# Patient Record
Sex: MALE | Race: WHITE
[De-identification: names, ages, dates, MRNs, and addresses within clinical notes are randomized per-mention and may not be internally consistent; named-entity substitution may affect disease eponyms.]

---

## 2020-01-30 PROBLEM — Z00.00 ENCOUNTER FOR PREVENTIVE HEALTH EXAMINATION: Status: ACTIVE | Noted: 2020-01-30

## 2020-02-03 ENCOUNTER — FORM ENCOUNTER (OUTPATIENT)
Age: 60
End: 2020-02-03

## 2020-02-04 ENCOUNTER — OUTPATIENT (OUTPATIENT)
Dept: OUTPATIENT SERVICES | Facility: HOSPITAL | Age: 60
LOS: 1 days | End: 2020-02-04
Payer: COMMERCIAL

## 2020-02-04 ENCOUNTER — APPOINTMENT (OUTPATIENT)
Dept: ORTHOPEDIC SURGERY | Facility: CLINIC | Age: 60
End: 2020-02-04
Payer: COMMERCIAL

## 2020-02-04 ENCOUNTER — APPOINTMENT (OUTPATIENT)
Dept: RADIOLOGY | Facility: CLINIC | Age: 60
End: 2020-02-04

## 2020-02-04 VITALS — WEIGHT: 196 LBS | BODY MASS INDEX: 25.98 KG/M2 | HEIGHT: 73 IN

## 2020-02-04 DIAGNOSIS — Z87.39 PERSONAL HISTORY OF OTHER DISEASES OF THE MUSCULOSKELETAL SYSTEM AND CONNECTIVE TISSUE: ICD-10-CM

## 2020-02-04 DIAGNOSIS — Z87.891 PERSONAL HISTORY OF NICOTINE DEPENDENCE: ICD-10-CM

## 2020-02-04 DIAGNOSIS — Z82.61 FAMILY HISTORY OF ARTHRITIS: ICD-10-CM

## 2020-02-04 DIAGNOSIS — Z78.9 OTHER SPECIFIED HEALTH STATUS: ICD-10-CM

## 2020-02-04 PROCEDURE — 99204 OFFICE O/P NEW MOD 45 MIN: CPT

## 2020-02-04 PROCEDURE — 73030 X-RAY EXAM OF SHOULDER: CPT | Mod: 26,50

## 2020-03-17 ENCOUNTER — APPOINTMENT (OUTPATIENT)
Dept: ORTHOPEDIC SURGERY | Facility: CLINIC | Age: 60
End: 2020-03-17

## 2021-01-27 ENCOUNTER — RESULT REVIEW (OUTPATIENT)
Age: 61
End: 2021-01-27

## 2021-01-27 ENCOUNTER — APPOINTMENT (OUTPATIENT)
Dept: ORTHOPEDIC SURGERY | Facility: CLINIC | Age: 61
End: 2021-01-27
Payer: MEDICAID

## 2021-01-27 ENCOUNTER — OUTPATIENT (OUTPATIENT)
Dept: OUTPATIENT SERVICES | Facility: HOSPITAL | Age: 61
LOS: 1 days | End: 2021-01-27
Payer: COMMERCIAL

## 2021-01-27 VITALS — HEIGHT: 72 IN | BODY MASS INDEX: 24.38 KG/M2 | RESPIRATION RATE: 16 BRPM | WEIGHT: 180 LBS

## 2021-01-27 DIAGNOSIS — M17.4 OTHER BILATERAL SECONDARY OSTEOARTHRITIS OF KNEE: ICD-10-CM

## 2021-01-27 DIAGNOSIS — M17.0 BILATERAL PRIMARY OSTEOARTHRITIS OF KNEE: ICD-10-CM

## 2021-01-27 PROCEDURE — 99072 ADDL SUPL MATRL&STAF TM PHE: CPT

## 2021-01-27 PROCEDURE — 99213 OFFICE O/P EST LOW 20 MIN: CPT

## 2021-01-27 PROCEDURE — 73564 X-RAY EXAM KNEE 4 OR MORE: CPT | Mod: 26,50

## 2021-01-27 PROCEDURE — 73564 X-RAY EXAM KNEE 4 OR MORE: CPT

## 2021-01-28 ENCOUNTER — TRANSCRIPTION ENCOUNTER (OUTPATIENT)
Age: 61
End: 2021-01-28

## 2021-01-29 PROBLEM — M17.4 OTHER SECONDARY OSTEOARTHRITIS OF BOTH KNEES: Status: ACTIVE | Noted: 2021-01-27

## 2021-01-29 PROBLEM — M17.0 PRIMARY OSTEOARTHRITIS OF BOTH KNEES: Noted: 2021-01-27

## 2021-01-29 RX ORDER — INDOMETHACIN 75 MG/1
75 CAPSULE, EXTENDED RELEASE ORAL DAILY
Qty: 90 | Refills: 0 | Status: DISCONTINUED | COMMUNITY
Start: 2021-01-27 | End: 2021-01-29

## 2021-01-29 RX ORDER — INDOMETHACIN 75 MG/1
75 CAPSULE, EXTENDED RELEASE ORAL DAILY
Qty: 90 | Refills: 0 | Status: ACTIVE | COMMUNITY
Start: 2021-01-29 | End: 1900-01-01

## 2021-02-04 ENCOUNTER — APPOINTMENT (OUTPATIENT)
Dept: RHEUMATOLOGY | Facility: CLINIC | Age: 61
End: 2021-02-04
Payer: MEDICAID

## 2021-02-04 VITALS
TEMPERATURE: 97.8 F | WEIGHT: 189 LBS | SYSTOLIC BLOOD PRESSURE: 127 MMHG | BODY MASS INDEX: 25.6 KG/M2 | DIASTOLIC BLOOD PRESSURE: 87 MMHG | OXYGEN SATURATION: 98 % | HEIGHT: 72 IN | HEART RATE: 58 BPM

## 2021-02-04 DIAGNOSIS — M11.9 CRYSTAL ARTHROPATHY, UNSPECIFIED: ICD-10-CM

## 2021-02-04 DIAGNOSIS — M65.80 CRYSTAL ARTHROPATHY, UNSPECIFIED: ICD-10-CM

## 2021-02-04 PROCEDURE — 99204 OFFICE O/P NEW MOD 45 MIN: CPT | Mod: 25

## 2021-02-04 PROCEDURE — 99072 ADDL SUPL MATRL&STAF TM PHE: CPT

## 2021-02-05 LAB
ALBUMIN SERPL ELPH-MCNC: 4.6 G/DL
ALP BLD-CCNC: 70 U/L
ALT SERPL-CCNC: 16 U/L
ANION GAP SERPL CALC-SCNC: 14 MMOL/L
AST SERPL-CCNC: 20 U/L
BASOPHILS # BLD AUTO: 0.03 K/UL
BASOPHILS NFR BLD AUTO: 0.4 %
BILIRUB SERPL-MCNC: 0.4 MG/DL
BUN SERPL-MCNC: 20 MG/DL
CALCIUM SERPL-MCNC: 10.2 MG/DL
CALCIUM SERPL-MCNC: 10.2 MG/DL
CHLORIDE SERPL-SCNC: 104 MMOL/L
CO2 SERPL-SCNC: 21 MMOL/L
CREAT SERPL-MCNC: 0.97 MG/DL
EOSINOPHIL # BLD AUTO: 0.09 K/UL
EOSINOPHIL NFR BLD AUTO: 1.3 %
GLUCOSE SERPL-MCNC: 94 MG/DL
HCT VFR BLD CALC: 45.9 %
HGB BLD-MCNC: 15.2 G/DL
IMM GRANULOCYTES NFR BLD AUTO: 0.3 %
LYMPHOCYTES # BLD AUTO: 2.41 K/UL
LYMPHOCYTES NFR BLD AUTO: 34.8 %
MAGNESIUM SERPL-MCNC: 2.2 MG/DL
MAN DIFF?: NORMAL
MCHC RBC-ENTMCNC: 32.3 PG
MCHC RBC-ENTMCNC: 33.1 GM/DL
MCV RBC AUTO: 97.7 FL
MONOCYTES # BLD AUTO: 0.37 K/UL
MONOCYTES NFR BLD AUTO: 5.3 %
NEUTROPHILS # BLD AUTO: 4.01 K/UL
NEUTROPHILS NFR BLD AUTO: 57.9 %
PARATHYROID HORMONE INTACT: 37 PG/ML
PHOSPHATE SERPL-MCNC: 3.5 MG/DL
PLATELET # BLD AUTO: 292 K/UL
POTASSIUM SERPL-SCNC: 5.4 MMOL/L
PROT SERPL-MCNC: 6.9 G/DL
RBC # BLD: 4.7 M/UL
RBC # FLD: 12.2 %
RHEUMATOID FACT SER QL: <10 IU/ML
SODIUM SERPL-SCNC: 138 MMOL/L
URATE SERPL-MCNC: 5.2 MG/DL
WBC # FLD AUTO: 6.93 K/UL

## 2021-02-07 NOTE — DATA REVIEWED
[FreeTextEntry1] : \par  XR KNEE COMPLETE 4 VIEWS BILATERAL             Final\par \par No Documents Attached\par \par \par \par \par   EXAM:  XR KNEE COMP 4+ VIEWS BI\par \par PROCEDURE DATE:  01/27/2021\par \par \par \par \par INTERPRETATION:  CLINICAL HISTORY: 60-year-old with knee pain.\par \par \par \par IMPRESSION: Frontal, flexion, lateral and patellar views of the right knee demonstrates moderate medial and mild lateral narrowing. Mild narrowing of the patellofemoral joint. Chondrocalcinosis of the medial and lateral meniscus. Appropriate osseous mineralization. Moderate joint effusion. No fracture. No dislocation.\par \par Frontal, flexion, lateral and patellar views of the left knee demonstrate severe medial and narrowing and mild narrowing of the lateral aspect of the patellofemoral joint. Moderate joint effusion. Chondrocalcinosis of the medial and lateral meniscus with extrusion of the body of the medial meniscus. No fracture. No dislocation. Appropriate osseous mineralization.\par \par \par \par \par \par \par Dr. Tracey Dos Santos can be reached at [email or web address] with any questions regarding this report.\par \par \par \par \par \par VIMAL BROWN M.D., RADIOLOGY RESIDENT\par This document has been electronically signed.\par TRACEY DOS SANTOS MD; Attending Radiologist\par This document has been electronically signed. Jan 27 2021  3:24PM\par \par  \par \par  Ordered by: CELINA SUH       Collected/Examined: 27Jan2021 03:13PM       \par Verified by: CELINA SUH 27Jan2021 04:28PM       \par  Result Communication: No patient communication needed at this time;\par Stage: Final       \par  Performed at: [facility]       Resulted: 27Jan2021 03:27PM       Last Updated: 27Jan2021 04:28PM       Accession: [id number]

## 2021-02-07 NOTE — ASSESSMENT
[FreeTextEntry1] : 60 year old man with history of bilateral knee and shoulder pain. Patient with chondrocalcinosis of the bilateral knees with recent flare associated with swelling and pain, now resolving.  Discussed role of NSAIDs, colchicine for prophylaxis, and joint aspiration/injection for flare as well.  Patient feeling better at this time and would like to continue NSAIDs as needed. In addition, some calcification noted on the shoulder xray in addition to degenerative changes. Patient will follow up with Dr. Fleming for further evaluation and management. Given symptoms, will check CBC, phosphorus, magnesium, uric acid, CMP, PTH, RF and anti CCP.  Further management pending results.

## 2021-02-07 NOTE — PHYSICAL EXAM
[General Appearance - Alert] : alert [General Appearance - Well Nourished] : well nourished [General Appearance - In No Acute Distress] : in no acute distress [General Appearance - Well Developed] : well developed [General Appearance - Well-Appearing] : healthy appearing [Sclera] : the sclera and conjunctiva were normal [Respiration, Rhythm And Depth] : normal respiratory rhythm and effort [Exaggerated Use Of Accessory Muscles For Inspiration] : no accessory muscle use [Edema] : there was no peripheral edema [Abnormal Walk] : normal gait [Nail Clubbing] : no clubbing  or cyanosis of the fingernails [Musculoskeletal - Swelling] : no joint swelling seen [] : no rash [Impaired Insight] : insight and judgment were intact [Oriented To Time, Place, And Person] : oriented to person, place, and time [Affect] : the affect was normal [FreeTextEntry1] : Decreased ROM of the shoulders bilaterally predominantly in external rotation.  Crepitus of the knees bilaterally without effusion or tenderness

## 2021-02-07 NOTE — HISTORY OF PRESENT ILLNESS
[FreeTextEntry1] : 60 year old man referred for evaluation\par Patient with longstanding history of pain in the knees and shoulders \par \par Pain present for the past 20 years\par Works as a Decorative , very physical occupation\par Working on knees initially, kneeling on floors and tiles\par Now works predominantly on ceilings, gilding, overhead activities\par \par Was seen by Dr. Fleming for the shoulder pain before covid\par history of arthroscopic bilateral knees to remove debris cartilage and debris 17 years ago\par Swimming felt made it worse, right knee started to hurt\par \par Was seen by Dr. Salgado for the knee pain, has intermittent swelling of the knee, no aspirations.\par Now much improved.\par \par Patient also with pain in the feet, thought had plantar fasciitis\par Whole bottom of the foot hurts, better with stretching\par \par Not been working as much as previously\par No history of steroid injections\par \par +ETOH\par +tobacco\par \par No history of COVID \par Tested regularly and negative, once every 1-2 weeks\par Lost weight in spring, now slowly gaining, \par \par Previously treated with voltaren which brought back from [place]\par No recent voltaren\par Takes tylenol \par Recently prescribed indomethacin, has not started to date\par Reviewed xrays with patient with images

## 2021-02-08 LAB
CCP AB SER IA-ACNC: <8 UNITS
RF+CCP IGG SER-IMP: NEGATIVE

## 2021-02-12 ENCOUNTER — APPOINTMENT (OUTPATIENT)
Dept: ORTHOPEDIC SURGERY | Facility: CLINIC | Age: 61
End: 2021-02-12

## 2021-02-16 ENCOUNTER — APPOINTMENT (OUTPATIENT)
Dept: ORTHOPEDIC SURGERY | Facility: CLINIC | Age: 61
End: 2021-02-16
Payer: MEDICAID

## 2021-02-16 VITALS — BODY MASS INDEX: 24.52 KG/M2 | HEIGHT: 73 IN | WEIGHT: 185 LBS

## 2021-02-16 DIAGNOSIS — M75.42 IMPINGEMENT SYNDROME OF LEFT SHOULDER: ICD-10-CM

## 2021-02-16 DIAGNOSIS — M75.41 IMPINGEMENT SYNDROME OF RIGHT SHOULDER: ICD-10-CM

## 2021-02-16 PROCEDURE — 20610 DRAIN/INJ JOINT/BURSA W/O US: CPT | Mod: RT

## 2021-02-16 PROCEDURE — 99072 ADDL SUPL MATRL&STAF TM PHE: CPT

## 2021-02-16 PROCEDURE — 99212 OFFICE O/P EST SF 10 MIN: CPT | Mod: 25

## 2021-02-17 ENCOUNTER — APPOINTMENT (OUTPATIENT)
Dept: ORTHOPEDIC SURGERY | Facility: CLINIC | Age: 61
End: 2021-02-17
Payer: MEDICAID

## 2021-02-17 VITALS — HEIGHT: 73 IN | WEIGHT: 185 LBS | BODY MASS INDEX: 24.52 KG/M2

## 2021-02-17 DIAGNOSIS — M11.20 OTHER CHONDROCALCINOSIS, UNSPECIFIED SITE: ICD-10-CM

## 2021-02-17 DIAGNOSIS — M71.22 SYNOVIAL CYST OF POPLITEAL SPACE [BAKER], LEFT KNEE: ICD-10-CM

## 2021-02-17 PROCEDURE — 99213 OFFICE O/P EST LOW 20 MIN: CPT

## 2021-02-17 PROCEDURE — 99072 ADDL SUPL MATRL&STAF TM PHE: CPT

## 2021-03-30 ENCOUNTER — APPOINTMENT (OUTPATIENT)
Dept: ORTHOPEDIC SURGERY | Facility: CLINIC | Age: 61
End: 2021-03-30
Payer: MEDICAID

## 2021-03-30 PROCEDURE — 99213 OFFICE O/P EST LOW 20 MIN: CPT

## 2021-03-30 PROCEDURE — 99072 ADDL SUPL MATRL&STAF TM PHE: CPT

## 2022-02-22 ENCOUNTER — APPOINTMENT (OUTPATIENT)
Dept: UROLOGY | Facility: CLINIC | Age: 62
End: 2022-02-22
Payer: MEDICAID

## 2022-02-22 VITALS
HEART RATE: 68 BPM | OXYGEN SATURATION: 98 % | DIASTOLIC BLOOD PRESSURE: 84 MMHG | TEMPERATURE: 97.7 F | SYSTOLIC BLOOD PRESSURE: 120 MMHG

## 2022-02-22 DIAGNOSIS — R35.0 FREQUENCY OF MICTURITION: ICD-10-CM

## 2022-02-22 PROCEDURE — 99204 OFFICE O/P NEW MOD 45 MIN: CPT

## 2022-02-22 RX ORDER — TAMSULOSIN HYDROCHLORIDE 0.4 MG/1
0.4 CAPSULE ORAL
Qty: 30 | Refills: 11 | Status: ACTIVE | COMMUNITY
Start: 2022-02-22 | End: 1900-01-01

## 2022-02-22 NOTE — ASSESSMENT
[FreeTextEntry1] : \par =======================================================================================\par ASSESSMENT and PLAN\par \par The patient is a 61 year male with a history of the following:\par \par 1. Urinary frequency:\par The patient and I discussed some of the possible causes which the patient understood. The causes include environmental/behavioral causes (caffeine, alcohol, spicy, acidic foods; increased fluid intake, stress, weather, constipation), systemic illness (such as DM, CHF, LE edema, SIADH), infectious causes (UTI, STD's), neurologic disorders (stroke, spinal cord injury, multiple sclerosis), functional urologic disorders (BPH, Primary BNO, Pelvic floor dysfunction, detrusor overactivity), and anatomic urologic disorders (urethral strictures).\par \par The patient's PVR was low and his dip was negative\par As such, I think that the patient is unlikely to have an infectious or systemic cause\par He is more likely to have idiopathic DO or BPH or both\par I have explained to the patient that his issue is NOTdangerous. \par As such treatment is dependent on the amount of bother the patient has.\par \par Based on this I recommended the following: \par \par -- Alpha blocker trial: The patient was prescribed TAmsulosin 0.4mg po QHS. The patient understands that this medication may cause dizziness, retrograde ejaculation or nasal congestion among other complications. I recommended that the patient take this medication at night. If the side effects become too bothersome, the medication can be discontinued.\par \par --Diet: I recommended that the patient avoid acidic and spicy foods and beverages. I also recommended reduction or cessation of alcohol and caffeine as these are diuretics and bladder irritants. He may switch to green tea\par \par -- Consider support stockings for his LE edema\par \par \par \par 2. ED\par We will table this for now\par \par \par -----------------------------------------------------------------------------------------------------\par LABS/TESTS Ordered:\par Meds Ordered: Tamsulosin 0.4mg po QHS\par Follow up: 4 mos\par -----------------------------------------------------------------------------------------------------\par \par Greater than 50% of this 45 minute visit was spent counseling the patient and coordinating care.\par \par Thank you for allowing me to assist in the care of your patient. Should you have any questions please do not hesitate to reach out to me.\par \par \par Margy Perez MD\par Associate \[facility] Department of Urology\par [facility] Phone: [phone]\par Fax: [phone]\par \par [address] [place] [id number]\par \par

## 2022-02-22 NOTE — HISTORY OF PRESENT ILLNESS
[FreeTextEntry1] : Language: English\par Date of First visit: 2022\par Accompanied by: Self\par Contact info: [phone]\par Referring Provider/PCP: Dr. Rivera\[facility]\par fax: [phone]\par \par \par CC/ Problem List:\par \par ===============================================================================\par FIRST VISIT:\par The patient is a 61 year male who first presents 2022 for urinary frequency. \par \par He states he has had frequency since he was an adult. His mother did as well and said she had a "small bladder". It has gotten worse since around age 58 and since Aug 2021 it has gotten worse. He voids only 4ozs of liquid. He has reduced his coffee consumption to 4ozs per day. He does drink water. He drinks 1-2 beers at night almost every night. He feels his stream is weak. He denies straining to void except at the end. He feels like he is done after voiding. He does have some post-void dribbling. He denies hematuria and dysuria. He denies constipation. He has HSV; he has no h/o GC, CT. He has never had a kidney stone or UTI. He denies h/o  trauma or surgery. He has no FH of  issues. He eats a LOT of spicy foods. He states his PSA was normal when it was checked by his PCP.\par \par He also feels that his erections are not the same. He states his erections are not as hard as it used to be. \par \par \par -------------------------------------------------------------------------------------------\par INTERVAL VISITS:\par \par \par ===============================================================================\par \par PMH: Arthritis, pseudo-Gout\par PSH: Knees, Shoulder\par POBH: (if applicable)\par FH: Mother  of Merkel cell age 72, Half Brother  of NHL age 60, Half sister had throat CA age 60\par \par ALL: NKDA ? cigarettes\par MEDS: Turmeric, Vitamin D, Hydrolized collagen, Lions fernando mushroom\par SOC: Active smoker since , Social ETOH; denies illegal drugs\par \par \par ROS: Review of Systems is as per HPI unless otherwise denoted below\par \par \par ===============================================================================\par DATA: \par \par LABS:-------------------------------------------------------------------------------------------------------------------\par 2022: UA negative\par \par \par RADS:-------------------------------------------------------------------------------------------------------------------\par \par \par \par PATHOLOGY/CYTOLOGY:-------------------------------------------------------------------------------------------\par \par \par \par VOIDING STUDIES: ----------------------------------------------------------------------------------------------------\par 2022: PVR 16\par \par \par STONE STUDIES: (Analysis/LLSA)----------------------------------------------------------------------------------\par \par \par \par PROCEDURES: -----------------------------------------------------------------------------------------------\par \par \par \par \par ===============================================================================\par \par PHYSICAL EXAM:\par \par GEN: AAOx3, NAD; Habitus: normal\par \par BARRIERS to CARE: none\par \par PSYCH: Appropriate Behavior, Affect Congruent\par \par HEENT: AT/NC Trachea midline. EOMI.\par \par Lungs: No labored breathing.\par \par NEURO: + Movement, all 4 extremities grossly intact without deficits. No tremors.\par \par SKIN: Warm dry. No visible rashes or ulcers\par \par GAIT: Gait mildly antalgic, Stability good\par \par ABD: Soft, NT ND. No rebound, No guarding. No masses.\par \par No suprapubic tenderness,\par \par MSK: No CVAT BL\par \par EXTR: No clubbing, cyanosis, mild pitting edema. Normal hair distribution. No venous stasis changes.\par \par LAD: No palpable pre-auricular, submental, submandibular, or cervical LAD. \par \par  FOCUSED: -------------------------------------------------------------------------------------------------\par \par Penis: Normal circ phallus. No lesions, tenderness, curvature, plaques.\par \par Meatus: No Stenosis, Orthotopic.\par \par Testes: Descended bilaterally. Non tender, no palpable masses.\par \par Epididymi: No palpable epididymal masses.\par \par Scrotum: Scrotal wall normal. No intrascrotal, extratesticular lesions. No palpable VV. Small BL hydroceles.\par \par Prostate: (date 2022) 20 grams. Smooth. No nodules. Symmetric. No tenderness, No Fluctuance.\par ARMIN: No hemorrhoids. Normal tone.\par =======================================================================================\par \par

## 2022-02-24 LAB
BILIRUB UR QL STRIP: NORMAL
CLARITY UR: CLEAR
COLLECTION METHOD: NORMAL
GLUCOSE UR-MCNC: NORMAL
HCG UR QL: 0.2 EU/DL
HGB UR QL STRIP.AUTO: NORMAL
KETONES UR-MCNC: NORMAL
LEUKOCYTE ESTERASE UR QL STRIP: NORMAL
NITRITE UR QL STRIP: NORMAL
PH UR STRIP: 6
PROT UR STRIP-MCNC: NORMAL
SP GR UR STRIP: 1.02

## 2022-06-23 ENCOUNTER — APPOINTMENT (OUTPATIENT)
Dept: UROLOGY | Facility: CLINIC | Age: 62
End: 2022-06-23

## 2024-03-06 ENCOUNTER — APPOINTMENT (OUTPATIENT)
Dept: ORTHOPEDIC SURGERY | Facility: CLINIC | Age: 64
End: 2024-03-06
Payer: MEDICARE

## 2024-03-06 VITALS — WEIGHT: 200 LBS | RESPIRATION RATE: 16 BRPM | BODY MASS INDEX: 26.51 KG/M2 | HEIGHT: 73 IN

## 2024-03-06 DIAGNOSIS — M19.011 PRIMARY OSTEOARTHRITIS, RIGHT SHOULDER: ICD-10-CM

## 2024-03-06 DIAGNOSIS — M19.012 PRIMARY OSTEOARTHRITIS, LEFT SHOULDER: ICD-10-CM

## 2024-03-06 PROCEDURE — 99213 OFFICE O/P EST LOW 20 MIN: CPT

## 2024-03-06 PROCEDURE — 73030 X-RAY EXAM OF SHOULDER: CPT | Mod: 50
